# Patient Record
Sex: FEMALE | URBAN - METROPOLITAN AREA
[De-identification: names, ages, dates, MRNs, and addresses within clinical notes are randomized per-mention and may not be internally consistent; named-entity substitution may affect disease eponyms.]

---

## 2020-09-17 ENCOUNTER — OFFICE VISIT (OUTPATIENT)
Dept: URBAN - METROPOLITAN AREA CLINIC 88 | Facility: CLINIC | Age: 45
End: 2020-09-17
Payer: COMMERCIAL

## 2020-09-17 DIAGNOSIS — H11.153 PINGUECULA, BILATERAL: ICD-10-CM

## 2020-09-17 DIAGNOSIS — H52.4 PRESBYOPIA: Primary | ICD-10-CM

## 2020-09-17 DIAGNOSIS — R51 HEADACHE: ICD-10-CM

## 2020-09-17 PROCEDURE — 92004 COMPRE OPH EXAM NEW PT 1/>: CPT | Performed by: OPTOMETRIST

## 2020-09-17 ASSESSMENT — VISUAL ACUITY
OD: 20/25
OS: 20/25

## 2020-09-17 ASSESSMENT — KERATOMETRY
OD: 44.63
OS: 44.75

## 2020-09-17 ASSESSMENT — INTRAOCULAR PRESSURE
OS: 15
OD: 15

## 2020-09-17 NOTE — IMPRESSION/PLAN
Impression: Pinguecula, bilateral: H11.153. Plan: Patient educated to condition. Sunglasses and UV protection recommended. Patient knows to RTC if redness or irritation are experienced.

## 2020-09-17 NOTE — IMPRESSION/PLAN
Impression: Headache: R51. Plan: Discussed status in detail with patient. Crowded disc OU observed today with no edema or hemorrhages; likely anatomical.  Based on headache symptoms will monitor with baseline workup of VF and OCT ON. Recommend patient continue care with PCP for headache management. Will schedule Visual field 30-2 and OCT ON for headache concerns.